# Patient Record
Sex: MALE | Race: WHITE | NOT HISPANIC OR LATINO | URBAN - METROPOLITAN AREA
[De-identification: names, ages, dates, MRNs, and addresses within clinical notes are randomized per-mention and may not be internally consistent; named-entity substitution may affect disease eponyms.]

---

## 2023-07-17 ENCOUNTER — HOSPITAL ENCOUNTER (EMERGENCY)
Facility: HOSPITAL | Age: 23
Discharge: HOME | End: 2023-07-17
Attending: EMERGENCY MEDICINE | Admitting: EMERGENCY MEDICINE
Payer: OTHER MISCELLANEOUS

## 2023-07-17 ENCOUNTER — APPOINTMENT (OUTPATIENT)
Dept: GENERAL RADIOLOGY | Facility: HOSPITAL | Age: 23
End: 2023-07-17
Payer: OTHER MISCELLANEOUS

## 2023-07-17 VITALS
WEIGHT: 200 LBS | HEART RATE: 61 BPM | OXYGEN SATURATION: 100 % | DIASTOLIC BLOOD PRESSURE: 72 MMHG | BODY MASS INDEX: 27.09 KG/M2 | RESPIRATION RATE: 18 BRPM | SYSTOLIC BLOOD PRESSURE: 130 MMHG | HEIGHT: 72 IN | TEMPERATURE: 98.2 F

## 2023-07-17 DIAGNOSIS — S81.012A LACERATION OF LEFT KNEE, INITIAL ENCOUNTER: Primary | ICD-10-CM

## 2023-07-17 PROCEDURE — 99283 EMERGENCY DEPT VISIT LOW MDM: CPT

## 2023-07-17 PROCEDURE — 99282 EMERGENCY DEPT VISIT SF MDM: CPT

## 2023-07-17 PROCEDURE — 73562 X-RAY EXAM OF KNEE 3: CPT

## 2023-07-17 RX ORDER — CEPHALEXIN 500 MG/1
500 CAPSULE ORAL 3 TIMES DAILY
Qty: 15 CAPSULE | Refills: 0 | Status: SHIPPED | OUTPATIENT
Start: 2023-07-17 | End: 2023-07-22

## 2023-07-17 RX ORDER — LIDOCAINE AND PRILOCAINE 25; 25 MG/G; MG/G
1 CREAM TOPICAL ONCE
Status: COMPLETED | OUTPATIENT
Start: 2023-07-17 | End: 2023-07-17

## 2023-07-17 RX ORDER — GINSENG 100 MG
1 CAPSULE ORAL ONCE
Status: COMPLETED | OUTPATIENT
Start: 2023-07-17 | End: 2023-07-17

## 2023-07-17 RX ORDER — LIDOCAINE HYDROCHLORIDE AND EPINEPHRINE 10; 10 MG/ML; UG/ML
10 INJECTION, SOLUTION INFILTRATION; PERINEURAL ONCE
Status: COMPLETED | OUTPATIENT
Start: 2023-07-17 | End: 2023-07-17

## 2023-07-17 RX ADMIN — LIDOCAINE HYDROCHLORIDE,EPINEPHRINE BITARTRATE 10 ML: 10; .01 INJECTION, SOLUTION INFILTRATION; PERINEURAL at 19:21

## 2023-07-17 RX ADMIN — BACITRACIN 0.9 G: 500 OINTMENT TOPICAL at 20:11

## 2023-07-17 RX ADMIN — LIDOCAINE AND PRILOCAINE 1 APPLICATION: 25; 25 CREAM TOPICAL at 17:05

## 2023-07-17 NOTE — ED PROVIDER NOTES
"Time: 4:30 PM EDT  Date of encounter:  7/17/2023  Independent Historian/Clinical History and Information was obtained by:   Patient    History is limited by: N/A    Chief Complaint: laceration      History of Present Illness:  Patient is a 22 y.o. year old male who presents to the emergency department for evaluation of laceration of left knee. Sustained 2 hours PTA. 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 Was running and landed on left knee on rock and grass. Initially had irrigation with IV bag but the were concerned for the amount of debris and potential for going into joint. No active bleeding. Tetanus UTD. Able to bear weight.     HPI    Patient Care Team  Primary Care Provider: Provider, No Known    Past Medical History:     No Known Allergies  History reviewed. No pertinent past medical history.  No past surgical history on file.  History reviewed. No pertinent family history.    Home Medications:  Prior to Admission medications    Not on File        Social History:          Review of Systems:  Review of Systems   Constitutional:  Negative for chills and fever.   Musculoskeletal:  Negative for arthralgias and joint swelling.   Skin:  Positive for wound.   Neurological:  Negative for numbness.   All other systems reviewed and are negative.     Physical Exam:  /72 (BP Location: Right arm, Patient Position: Sitting)   Pulse 61   Temp 98.2 °F (36.8 °C) (Oral)   Resp 18   Ht 182.9 cm (72\")   Wt 90.7 kg (200 lb)   SpO2 100%   BMI 27.12 kg/m²     Physical Exam  Vitals and nursing note reviewed.   Constitutional:       Appearance: Normal appearance. He is not ill-appearing or toxic-appearing.   HENT:      Head: Normocephalic.      Nose: Nose normal.      Mouth/Throat:      Mouth: Mucous membranes are moist.   Eyes:      Conjunctiva/sclera: Conjunctivae normal.   Cardiovascular:      Rate and Rhythm: Normal rate and regular rhythm.   Pulmonary:      Effort: Pulmonary effort is normal.      Breath sounds: " Normal breath sounds.   Musculoskeletal:         General: Normal range of motion.      Cervical back: Normal range of motion.      Left knee: Laceration present. No effusion or crepitus. Normal range of motion. No tenderness.      Comments: Full ROM of left knee. No effusion.    Skin:     General: Skin is warm and dry.      Capillary Refill: Capillary refill takes less than 2 seconds.   Neurological:      Mental Status: He is alert.                Procedures:  Laceration Repair    Date/Time: 7/17/2023 8:04 PM  Performed by: Antione Mendes PA-C  Authorized by: Taurus Agarwal MD     Consent:     Consent obtained:  Verbal    Consent given by:  Patient    Risks, benefits, and alternatives were discussed: yes      Risks discussed:  Infection, pain, poor cosmetic result, poor wound healing and need for additional repair    Alternatives discussed:  No treatment  Universal protocol:     Procedure explained and questions answered to patient or proxy's satisfaction: yes      Patient identity confirmed:  Verbally with patient  Anesthesia:     Anesthesia method:  Local infiltration and topical application    Topical anesthetic:  EMLA cream    Local anesthetic:  Lidocaine 1% w/o epi  Laceration details:     Location:  Leg    Leg location:  L knee    Length (cm):  6  Pre-procedure details:     Preparation:  Patient was prepped and draped in usual sterile fashion  Exploration:     Contaminated: yes    Treatment:     Amount of cleaning:  Extensive    Irrigation solution:  Sterile saline    Irrigation volume:  750 cc    Irrigation method:  Syringe    Visualized foreign bodies/material removed: yes      Debridement:  None  Skin repair:     Repair method:  Sutures    Suture size: 4-0 vicryl, 3-0 and 4-0 ethilon.    Suture technique: 2 deep dermal, 2 corner, 5 simple.    Number of sutures:  9  Approximation:     Approximation:  Close  Repair type:     Repair type:  Complex  Post-procedure details:     Dressing:  Antibiotic  ointment, bulky dressing and non-adherent dressing    Procedure completion:  Tolerated well, no immediate complications      Medical Decision Making:      Comorbidities that affect care:    None    External Notes reviewed:    None      The following orders were placed and all results were independently analyzed by me:  Orders Placed This Encounter   Procedures    Laceration Repair    XR Knee 3 View Left       Medications Given in the Emergency Department:  Medications   lidocaine-prilocaine (EMLA) 2.5-2.5 % cream 1 application  (1 application  Topical Given 7/17/23 1705)   lidocaine 1% - EPINEPHrine 1:199645 (XYLOCAINE W/EPI) 1 %-1:634629 injection 10 mL (10 mL Injection Given 7/17/23 1921)   bacitracin 500 UNIT/GM ointment 0.9 g (0.9 g Topical Given 7/17/23 2011)        ED Course:         Labs:    Lab Results (last 24 hours)       ** No results found for the last 24 hours. **             Imaging:    XR Knee 3 View Left    Result Date: 7/17/2023  PROCEDURE: XR KNEE 3 VW LEFT  COMPARISON: None  INDICATIONS: laceration on the anteriolateral aspect of left knee  FINDINGS:  No acute fracture or dislocation is noted.  The joint space appears preserved.  Increased densities noted along the anterior aspect of the knee at the level of the patella tendon.  Findings compatible with given history of laceration.  Punctate densities measuring up to 2-3 mm noted which may represent foreign bodies on or within the soft tissues        1. Soft tissue irregularity, increased density compatible with given history of laceration.  Foreign body on or within the soft tissue noted.  Please correlate clinically.  No definite acute osseous abnormality noted.      ENRIQUE SANDERSON MD       Electronically Signed and Approved By: ENRIQUE SANDERSON MD on 7/17/2023 at 17:35                Differential Diagnosis and Discussion:    Laceration: Laceration was evaluated for arterial injury, ligamentous damage, and other neurovascular injury.    All  X-rays impressions were independently interpreted by me.    MDM           Patient Care Considerations:    CT EXTREMITY: I considered ordering an extremity CT, however no appreciable joint space involvement on examination or XR      Consultants/Shared Management Plan:    I have discussed the case with Dr. Agarwal, supervising physician who states that the patient can be safely discharged with close follow up.    Social Determinants of Health:    Patient is independent, reliable, and has access to care.       Disposition and Care Coordination:    Discharged: The patient is suitable and stable for discharge with no need for consideration of observation or admission.    The patient presented with a laceration in need of repair. See laceration repair note for details. The wound was irrigated with copious normal saline irrigation. 9 sutures were used to approximate the wound edges. Tetanus not given, UTD. The patient tolerated the procedure well. Acute bleeding has ceased and the wound was approximated in the emergency department. Patient was counseled to keep the wound clean, dry, and out of the sun. Patient was counseled to change dressings daily. Patient was advised to return to the ED for worsening erythema, pain, swelling, fever, excessive drainage or signs of infection. They were counseled to follow up for suture removal as described in the discharge instructions. Patient verbalizes understanding and agrees to follow up as instructed.     I have explained the patient´s condition, diagnoses and treatment plan based on the information available to me at this time. I have answered questions and addressed any concerns. The patient has a good  understanding of the patient´s diagnosis, condition, and treatment plan as can be expected at this point. The vital signs have been stable. The patient´s condition is stable and appropriate for discharge from the emergency department.      The patient will pursue further outpatient  evaluation with the primary care physician or other designated or consulting physician as outlined in the discharge instructions. They are agreeable to this plan of care and follow-up instructions have been explained in detail. The patient has received these instructions in written format and have expressed an understanding of the discharge instructions. The patient is aware that any significant change in condition or worsening of symptoms should prompt an immediate return to this or the closest emergency department or call to 911.  I have explained discharge medications and the need for follow up with the patient/caretakers. This was also printed in the discharge instructions. Patient was discharged with the following medications and follow up:      Medication List        New Prescriptions      cephalexin 500 MG capsule  Commonly known as: KEFLEX  Take 1 capsule by mouth 3 (Three) Times a Day for 5 days.               Where to Get Your Medications        These medications were sent to [facility] - [place], [place] - [address] - [phone]  - [phone]  [address] [place] [address]      Phone: [phone]   cephalexin 500 MG capsule      Provider, No Known  [facility] [id number]          [facility] EMERGENCY ROOM  [address] [address] [phone]    If symptoms worsen       Final diagnoses:   Laceration of left knee, initial encounter        ED Disposition       ED Disposition   Discharge    Condition   Stable    Comment   --               This medical record created using voice recognition software.             Antione Mendes PA-C  07/17/23 5300

## 2023-07-18 NOTE — DISCHARGE INSTRUCTIONS
I have washed your wound out well and gotten all debris out. Your X-ray shows no fracture but did show debris. It does not extend into the joint. I have sutured your knee. Try to decreased ROM as much as possible, if you can wear an ace wrap under your pants and keep clean as much as possible. I have placed you on an antibiotic. The sutures need to be removed in 7-10 days